# Patient Record
(demographics unavailable — no encounter records)

---

## 2024-11-25 NOTE — HISTORY OF PRESENT ILLNESS
[Other: _____] : [unfilled] [FreeTextEntry1] : physical [de-identified] : Patient comes for an annual exam.  He reports feeling well. He is now seeing psychiatrist Dr. Cummings for anxiety disorder. He was started on Fluoxetine 30 mg. His anxiety has since improved. He stopped the Xanax.

## 2024-11-25 NOTE — HEALTH RISK ASSESSMENT
[Good] : ~his/her~  mood as  good [No] : In the past 12 months have you used drugs other than those required for medical reasons? No [No falls in past year] : Patient reported no falls in the past year [0] : 2) Feeling down, depressed, or hopeless: Not at all (0) [PHQ-2 Negative - No further assessment needed] : PHQ-2 Negative - No further assessment needed [Never] : Never [None] : None [Unemployed] : unemployed [Single] : single [# Of Children ___] : has [unfilled] children [Smoke Detector] : smoke detector [Carbon Monoxide Detector] : carbon monoxide detector [Seat Belt] :  uses seat belt [Sunscreen] : uses sunscreen [[id number]] : 0 [Sexually Active] : not sexually active [High Risk Behavior] : no high risk behavior [Reports changes in hearing] : Reports no changes in hearing [Reports changes in vision] : Reports no changes in vision [Reports changes in dental health] : Reports no changes in dental health

## 2024-11-25 NOTE — PLAN
[FreeTextEntry1] : Check routine fasting labs. Discussed diet, exercise, and weight maintenance. He gained weight, BMI now 19. Flu shot given today. Routine scrotal checks in shower. ENT referral for left cerumen extraction. History of autism / ADHD - not on medication.  Anxiety - mood controlled on Fluoxetine 30 mg qd. Followed with psychiatry.   Return to office in 1 year or as needed.

## 2024-11-25 NOTE — PHYSICAL EXAM
[No Lymphadenopathy] : no lymphadenopathy [Supple] : supple [Normal] : normal rate, regular rhythm, normal S1 and S2 and no murmur heard [No Edema] : there was no peripheral edema [Soft] : abdomen soft [Non Tender] : non-tender [Non-distended] : non-distended [Normal Bowel Sounds] : normal bowel sounds [Normal Posterior Cervical Nodes] : no posterior cervical lymphadenopathy [Normal Anterior Cervical Nodes] : no anterior cervical lymphadenopathy [No Spinal Tenderness] : no spinal tenderness [No Joint Swelling] : no joint swelling [No Rash] : no rash [Normal Gait] : normal gait [Normal Affect] : the affect was normal [Normal Mood] : the mood was normal [de-identified] : young male, thin appearance  [de-identified] : occluded left cerumen [de-identified] : birthmark left side of abdomen

## 2024-11-25 NOTE — REVIEW OF SYSTEMS
[Negative] : Heme/Lymph [Anxiety] : no anxiety [Depression] : no depression [FreeTextEntry2] : 15 lb gain

## 2024-11-25 NOTE — HISTORY OF PRESENT ILLNESS
[Other: _____] : [unfilled] [FreeTextEntry1] : physical [de-identified] : Patient comes for an annual exam.  He reports feeling well. He is now seeing psychiatrist Dr. Cummings for anxiety disorder. He was started on Fluoxetine 30 mg. His anxiety has since improved. He stopped the Xanax.

## 2024-11-25 NOTE — HEALTH RISK ASSESSMENT
[Good] : ~his/her~  mood as  good [No falls in past year] : Patient reported no falls in the past year [No] : In the past 12 months have you used drugs other than those required for medical reasons? No [0] : 2) Feeling down, depressed, or hopeless: Not at all (0) [PHQ-2 Negative - No further assessment needed] : PHQ-2 Negative - No further assessment needed [Never] : Never [None] : None [Unemployed] : unemployed [Single] : single [# Of Children ___] : has [unfilled] children [Smoke Detector] : smoke detector [Carbon Monoxide Detector] : carbon monoxide detector [Seat Belt] :  uses seat belt [Sunscreen] : uses sunscreen [[id number]] : 0 [Sexually Active] : not sexually active [High Risk Behavior] : no high risk behavior [Reports changes in hearing] : Reports no changes in hearing [Reports changes in vision] : Reports no changes in vision [Reports changes in dental health] : Reports no changes in dental health

## 2024-11-25 NOTE — PHYSICAL EXAM
[No Lymphadenopathy] : no lymphadenopathy [Supple] : supple [Normal] : normal rate, regular rhythm, normal S1 and S2 and no murmur heard [No Edema] : there was no peripheral edema [Soft] : abdomen soft [Non Tender] : non-tender [Non-distended] : non-distended [Normal Bowel Sounds] : normal bowel sounds [Normal Posterior Cervical Nodes] : no posterior cervical lymphadenopathy [Normal Anterior Cervical Nodes] : no anterior cervical lymphadenopathy [No Spinal Tenderness] : no spinal tenderness [No Joint Swelling] : no joint swelling [No Rash] : no rash [Normal Gait] : normal gait [Normal Affect] : the affect was normal [Normal Mood] : the mood was normal [de-identified] : young male, thin appearance  [de-identified] : occluded left cerumen [de-identified] : birthmark left side of abdomen

## 2024-12-02 NOTE — HISTORY OF PRESENT ILLNESS
[FreeTextEntry8] : Patient comes for an acute visit.   He is here for evaluation of cut on right hand x few days. He is not sure how he cut his hand. No recent injury. His father has been applying bacitracin to area. He has noticed clear discharge from wound. No fever or chills. He otherwise feels well.

## 2024-12-02 NOTE — PHYSICAL EXAM
[Normal] : normal rate, regular rhythm, normal S1 and S2 and no murmur heard [No Edema] : there was no peripheral edema [Normal Anterior Cervical Nodes] : no anterior cervical lymphadenopathy [de-identified] : young male, thin appearance  [de-identified] : right hand - 1 cm superficial wound, no discharge, early granulation tissue present

## 2024-12-02 NOTE — PLAN
[FreeTextEntry1] : Healing right hand wound. Start Mupirocin ointment for few days. Monitor hand for worsening erythema, warmth, or discharge. Avoid picking skin.